# Patient Record
Sex: FEMALE | Race: WHITE | Employment: OTHER | URBAN - METROPOLITAN AREA
[De-identification: names, ages, dates, MRNs, and addresses within clinical notes are randomized per-mention and may not be internally consistent; named-entity substitution may affect disease eponyms.]

---

## 2017-07-03 ENCOUNTER — APPOINTMENT (OUTPATIENT)
Dept: LAB | Facility: HOSPITAL | Age: 77
End: 2017-07-03
Attending: DERMATOLOGY
Payer: MEDICARE

## 2017-07-03 ENCOUNTER — OFFICE VISIT (OUTPATIENT)
Dept: DERMATOLOGY CLINIC | Facility: CLINIC | Age: 77
End: 2017-07-03

## 2017-07-03 DIAGNOSIS — L81.4 LENTIGO: ICD-10-CM

## 2017-07-03 DIAGNOSIS — D22.9 MULTIPLE NEVI: ICD-10-CM

## 2017-07-03 DIAGNOSIS — L82.1 SEBORRHEIC KERATOSES: ICD-10-CM

## 2017-07-03 DIAGNOSIS — L82.0 INFLAMED SEBORRHEIC KERATOSIS: ICD-10-CM

## 2017-07-03 DIAGNOSIS — D48.5 NEOPLASM OF UNCERTAIN BEHAVIOR OF SKIN: Primary | ICD-10-CM

## 2017-07-03 PROCEDURE — 11100 BIOPSY OF SKIN LESION: CPT | Performed by: DERMATOLOGY

## 2017-07-03 PROCEDURE — 88305 TISSUE EXAM BY PATHOLOGIST: CPT

## 2017-07-03 PROCEDURE — 99202 OFFICE O/P NEW SF 15 MIN: CPT | Performed by: DERMATOLOGY

## 2017-07-03 RX ORDER — LOSARTAN POTASSIUM 100 MG/1
TABLET ORAL DAILY
COMMUNITY

## 2017-07-03 RX ORDER — ATORVASTATIN CALCIUM 40 MG/1
40 TABLET, FILM COATED ORAL NIGHTLY
COMMUNITY

## 2017-07-03 NOTE — H&P
Past Medical History:   Diagnosis Date   • Atrial fibrillation [facility])    • Essential hypertension    • Hyperlipidemia      History reviewed. No pertinent surgical history.     Social History  Social History   Marital status:   Spouse name: N/A    Year

## 2017-07-06 NOTE — PROGRESS NOTES
The pathology report from last visit showed  Verruca vulgaris  . Please log in test results, send biopsy results letter. Pt to rtc 1 year or prn.

## 2017-07-23 NOTE — PROGRESS NOTES
Nicole Reyes is a 78year old female. HPI:     CC:  Patient presents with:  Lesion: 1st time KMT no personal h/o skin cancer would like a couple of facial lesions checked        Allergies:  Review of patient's allergies indicates no known allergies.     H presents with:  Lesion: 1st time KMT no personal h/o skin cancer would like a couple of facial lesions checked    Patient has been with metastatic melanoma concerned he has had multiple skin cancers as noted lesion growing quickly tender at left lower lid. of uncertain behavior of skin  (primary encounter diagnosis)  Inflamed seborrheic keratosis  Seborrheic keratoses  Lentigo  Multiple nevi    See details on map. Remarkable for:    Rule out squamous cell carcinoma left lower lid, infraorbital area.   4

## 2017-11-21 PROBLEM — F32.A ANXIETY AND DEPRESSION: Status: ACTIVE | Noted: 2017-11-21

## 2017-11-21 PROBLEM — J06.9 UPPER RESPIRATORY TRACT INFECTION, UNSPECIFIED TYPE: Status: ACTIVE | Noted: 2017-11-21

## 2017-11-21 PROBLEM — F41.9 ANXIETY AND DEPRESSION: Status: ACTIVE | Noted: 2017-11-21

## 2017-11-21 PROBLEM — H93.12 TINNITUS, LEFT: Status: ACTIVE | Noted: 2017-11-21

## 2017-11-21 PROBLEM — H93.13 TINNITUS OF BOTH EARS: Status: ACTIVE | Noted: 2017-11-21

## 2018-02-13 PROCEDURE — 81001 URINALYSIS AUTO W/SCOPE: CPT | Performed by: INTERNAL MEDICINE

## 2018-02-13 PROCEDURE — 87086 URINE CULTURE/COLONY COUNT: CPT | Performed by: INTERNAL MEDICINE

## 2018-09-19 PROCEDURE — 87086 URINE CULTURE/COLONY COUNT: CPT | Performed by: INTERNAL MEDICINE

## 2018-09-19 PROCEDURE — 81001 URINALYSIS AUTO W/SCOPE: CPT | Performed by: INTERNAL MEDICINE

## 2019-01-18 ENCOUNTER — OFFICE VISIT (OUTPATIENT)
Dept: SLEEP MEDICINE | Age: 79
End: 2019-01-18

## 2019-01-18 DIAGNOSIS — G47.33 OSA (OBSTRUCTIVE SLEEP APNEA): ICD-10-CM

## 2019-01-18 PROCEDURE — 95806 SLEEP STUDY UNATT&RESP EFFT: CPT | Performed by: INTERNAL MEDICINE

## 2019-05-15 PROCEDURE — 81001 URINALYSIS AUTO W/SCOPE: CPT | Performed by: INTERNAL MEDICINE

## 2020-09-02 PROBLEM — M17.11 PRIMARY OSTEOARTHRITIS OF RIGHT KNEE: Status: ACTIVE | Noted: 2019-07-30

## 2020-09-02 PROBLEM — M17.11 ARTHRITIS OF RIGHT KNEE: Status: ACTIVE | Noted: 2019-10-10

## 2020-09-02 PROBLEM — M19.011 PRIMARY OSTEOARTHRITIS OF RIGHT SHOULDER: Status: ACTIVE | Noted: 2020-01-10

## 2020-09-02 PROBLEM — Z96.651 S/P TOTAL KNEE ARTHROPLASTY, RIGHT: Status: ACTIVE | Noted: 2019-10-11

## 2020-09-02 PROBLEM — I48.19 PERSISTENT ATRIAL FIBRILLATION (HCC): Status: ACTIVE | Noted: 2020-09-02

## 2021-04-20 ENCOUNTER — LAB REQUISITION (OUTPATIENT)
Dept: LAB | Age: 81
End: 2021-04-20

## 2021-04-20 DIAGNOSIS — R19.7 DIARRHEA, UNSPECIFIED: ICD-10-CM

## 2021-04-20 DIAGNOSIS — R19.4 CHANGE IN BOWEL HABIT: ICD-10-CM

## 2021-04-20 DIAGNOSIS — K62.1 RECTAL POLYP: ICD-10-CM

## 2021-04-20 DIAGNOSIS — K63.5 POLYP OF COLON: ICD-10-CM

## 2021-04-20 PROCEDURE — 87177 OVA AND PARASITES SMEARS: CPT | Performed by: CLINICAL MEDICAL LABORATORY

## 2021-04-20 PROCEDURE — 87209 SMEAR COMPLEX STAIN: CPT | Performed by: CLINICAL MEDICAL LABORATORY

## 2021-04-20 PROCEDURE — 87493 C DIFF AMPLIFIED PROBE: CPT | Performed by: CLINICAL MEDICAL LABORATORY

## 2021-04-21 LAB — C DIFF TOX B TCDB STL QL NAA+PROBE: NOT DETECTED

## 2021-04-22 LAB — O+P SPEC MICRO: NORMAL

## 2021-11-02 ENCOUNTER — IMAGING SERVICES (OUTPATIENT)
Dept: MAMMOGRAPHY | Age: 81
End: 2021-11-02
Attending: NURSE PRACTITIONER

## 2021-11-02 ENCOUNTER — IMAGING SERVICES (OUTPATIENT)
Dept: BONE DENSITY | Age: 81
End: 2021-11-02
Attending: NURSE PRACTITIONER

## 2021-11-02 DIAGNOSIS — Z12.31 SCREENING MAMMOGRAM, ENCOUNTER FOR: ICD-10-CM

## 2021-11-02 DIAGNOSIS — Z78.0 POSTMENOPAUSAL: ICD-10-CM

## 2021-11-02 PROCEDURE — 77063 BREAST TOMOSYNTHESIS BI: CPT | Performed by: RADIOLOGY

## 2021-11-02 PROCEDURE — 77080 DXA BONE DENSITY AXIAL: CPT | Performed by: RADIOLOGY

## 2021-11-02 PROCEDURE — 77067 SCR MAMMO BI INCL CAD: CPT | Performed by: RADIOLOGY

## 2021-11-08 ENCOUNTER — IMAGING SERVICES (OUTPATIENT)
Dept: MAMMOGRAPHY | Age: 81
End: 2021-11-08
Attending: NURSE PRACTITIONER

## 2021-11-08 DIAGNOSIS — R92.8 ABNORMAL MAMMOGRAM: ICD-10-CM

## 2021-11-08 PROCEDURE — 76641 ULTRASOUND BREAST COMPLETE: CPT | Performed by: RADIOLOGY

## 2021-11-08 PROCEDURE — G0279 TOMOSYNTHESIS, MAMMO: HCPCS | Performed by: RADIOLOGY

## 2021-11-08 PROCEDURE — 77065 DX MAMMO INCL CAD UNI: CPT | Performed by: RADIOLOGY

## 2022-07-14 ENCOUNTER — IMAGING SERVICES (OUTPATIENT)
Dept: MAMMOGRAPHY | Age: 82
End: 2022-07-14
Attending: NURSE PRACTITIONER

## 2022-07-14 DIAGNOSIS — R92.8 CATEGORY 3 MAMMOGRAPHY RESULT WITH SHORT FOLLOW-UP INTERVAL SUGGESTED FOR PROBABLY BENIGN FINDING: ICD-10-CM

## 2022-07-14 DIAGNOSIS — Z09 FOLLOW-UP EXAMINATION: ICD-10-CM

## 2022-07-14 PROCEDURE — 77065 DX MAMMO INCL CAD UNI: CPT | Performed by: RADIOLOGY

## 2022-07-14 PROCEDURE — 76641 ULTRASOUND BREAST COMPLETE: CPT | Performed by: RADIOLOGY

## 2022-07-14 PROCEDURE — G0279 TOMOSYNTHESIS, MAMMO: HCPCS | Performed by: RADIOLOGY

## 2022-07-14 PROCEDURE — G1004 CDSM NDSC: HCPCS | Performed by: RADIOLOGY

## 2022-07-21 ENCOUNTER — IMAGING SERVICES (OUTPATIENT)
Dept: GENERAL RADIOLOGY | Age: 82
End: 2022-07-21
Attending: HOSPITALIST

## 2022-07-21 DIAGNOSIS — I48.91 ATRIAL FIBRILLATION (CMD): ICD-10-CM

## 2022-07-21 DIAGNOSIS — G62.9 NEUROPATHY: ICD-10-CM

## 2022-07-21 PROCEDURE — 72114 X-RAY EXAM L-S SPINE BENDING: CPT | Performed by: RADIOLOGY

## 2023-03-25 ENCOUNTER — APPOINTMENT (OUTPATIENT)
Dept: MAMMOGRAPHY | Age: 83
End: 2023-03-25
Attending: FAMILY MEDICINE

## 2023-04-28 DIAGNOSIS — R92.2 INCONCLUSIVE MAMMOGRAM: ICD-10-CM

## 2023-04-28 DIAGNOSIS — N63.0 BREAST NODULE: Primary | ICD-10-CM

## 2023-05-01 ENCOUNTER — IMAGING SERVICES (OUTPATIENT)
Dept: MAMMOGRAPHY | Age: 83
End: 2023-05-01
Attending: FAMILY MEDICINE

## 2023-05-01 DIAGNOSIS — R92.2 INCONCLUSIVE MAMMOGRAM: ICD-10-CM

## 2023-05-01 DIAGNOSIS — N63.0 BREAST NODULE: ICD-10-CM

## 2023-05-01 PROCEDURE — G0279 TOMOSYNTHESIS, MAMMO: HCPCS | Performed by: RADIOLOGY

## 2023-05-01 PROCEDURE — 77066 DX MAMMO INCL CAD BI: CPT | Performed by: RADIOLOGY

## 2024-04-02 PROBLEM — E78.00 PURE HYPERCHOLESTEROLEMIA: Status: ACTIVE | Noted: 2024-04-02

## 2024-04-02 PROBLEM — I10 HYPERTENSION: Status: ACTIVE | Noted: 2022-07-11

## 2024-04-04 ENCOUNTER — LAB SERVICES (OUTPATIENT)
Dept: LAB | Age: 84
End: 2024-04-04

## 2024-04-04 ENCOUNTER — ANCILLARY PROCEDURE (OUTPATIENT)
Dept: CARDIOLOGY | Age: 84
End: 2024-04-04
Attending: INTERNAL MEDICINE

## 2024-04-04 DIAGNOSIS — I48.0 PAROXYSMAL ATRIAL FIBRILLATION  (CMD): ICD-10-CM

## 2024-04-04 LAB
ALBUMIN SERPL-MCNC: 3.9 G/DL (ref 3.6–5.1)
ALBUMIN/GLOB SERPL: 1.5 {RATIO} (ref 1–2.4)
ALP SERPL-CCNC: 106 UNITS/L (ref 45–117)
ALT SERPL-CCNC: 16 UNITS/L
ANION GAP SERPL CALC-SCNC: 15 MMOL/L (ref 7–19)
AST SERPL-CCNC: 14 UNITS/L
AV STENOSIS SEVERITY TEXT: NORMAL
AVI LVOT PEAK GRADIENT (LVOTMG): 1.1
BASOPHILS # BLD: 0.1 K/MCL (ref 0–0.3)
BASOPHILS NFR BLD: 1 %
BILIRUB SERPL-MCNC: 0.7 MG/DL (ref 0.2–1)
BUN SERPL-MCNC: 16 MG/DL (ref 6–20)
BUN/CREAT SERPL: 21 (ref 7–25)
CALCIUM SERPL-MCNC: 9.4 MG/DL (ref 8.4–10.2)
CHLORIDE SERPL-SCNC: 106 MMOL/L (ref 97–110)
CHOLEST SERPL-MCNC: 159 MG/DL
CHOLEST/HDLC SERPL: 3.3 {RATIO}
CO2 SERPL-SCNC: 24 MMOL/L (ref 21–32)
CREAT SERPL-MCNC: 0.77 MG/DL (ref 0.51–0.95)
DEPRECATED RDW RBC: 41.6 FL (ref 39–50)
EGFRCR SERPLBLD CKD-EPI 2021: 77 ML/MIN/{1.73_M2}
EOSINOPHIL # BLD: 0.4 K/MCL (ref 0–0.5)
EOSINOPHIL NFR BLD: 5 %
ERYTHROCYTE [DISTWIDTH] IN BLOOD: 12.5 % (ref 11–15)
FASTING DURATION TIME PATIENT: 12 HOURS (ref 0–999)
GLOBULIN SER-MCNC: 2.6 G/DL (ref 2–4)
GLUCOSE SERPL-MCNC: 94 MG/DL (ref 70–99)
HCT VFR BLD CALC: 46.4 % (ref 36–46.5)
HDLC SERPL-MCNC: 48 MG/DL
HGB BLD-MCNC: 15.8 G/DL (ref 12–15.5)
IMM GRANULOCYTES # BLD AUTO: 0 K/MCL (ref 0–0.2)
IMM GRANULOCYTES # BLD: 0 %
LDLC SERPL CALC-MCNC: 80 MG/DL
LEFT INTERNAL DIMENSION IN SYSTOLE (LVSD): 1.1
LEFT VENTRICULAR INTERNAL DIMENSION IN DIASTOLE (LVDD): 2.3
LEFT VENTRICULAR POSTERIOR WALL IN END DIASTOLE (LVPW): 3.5
LV EF: NORMAL %
LVOT 2D (LVOTD): 15.6
LVOT VTI (LVOTVTI): 0.87
LYMPHOCYTES # BLD: 2.4 K/MCL (ref 1–4)
LYMPHOCYTES NFR BLD: 30 %
MCH RBC QN AUTO: 30.7 PG (ref 26–34)
MCHC RBC AUTO-ENTMCNC: 34.1 G/DL (ref 32–36.5)
MCV RBC AUTO: 90.3 FL (ref 78–100)
MONOCYTES # BLD: 0.7 K/MCL (ref 0.3–0.9)
MONOCYTES NFR BLD: 9 %
NEUTROPHILS # BLD: 4.4 K/MCL (ref 1.8–7.7)
NEUTROPHILS NFR BLD: 55 %
NONHDLC SERPL-MCNC: 111 MG/DL
NRBC BLD MANUAL-RTO: 0 /100 WBC
PLATELET # BLD AUTO: 249 K/MCL (ref 140–450)
POTASSIUM SERPL-SCNC: 4 MMOL/L (ref 3.4–5.1)
PROT SERPL-MCNC: 6.5 G/DL (ref 6.4–8.2)
RBC # BLD: 5.14 MIL/MCL (ref 4–5.2)
RV END SYSTOLIC LONGITUDINAL STRAIN FREE WALL (RVGS): 1.7
SODIUM SERPL-SCNC: 141 MMOL/L (ref 135–145)
T4 FREE SERPL-MCNC: 1 NG/DL (ref 0.8–1.5)
TRICUSPID VALVE ANNULAR PEAK VELOCITY (TVAPV): 22
TRIGL SERPL-MCNC: 156 MG/DL
TSH SERPL-ACNC: 3.68 MCUNITS/ML (ref 0.35–5)
WBC # BLD: 8 K/MCL (ref 4.2–11)

## 2024-04-04 PROCEDURE — 80053 COMPREHEN METABOLIC PANEL: CPT | Performed by: CLINICAL MEDICAL LABORATORY

## 2024-04-04 PROCEDURE — 85025 COMPLETE CBC W/AUTO DIFF WBC: CPT | Performed by: CLINICAL MEDICAL LABORATORY

## 2024-04-04 PROCEDURE — 36415 COLL VENOUS BLD VENIPUNCTURE: CPT | Performed by: CLINICAL MEDICAL LABORATORY

## 2024-04-04 PROCEDURE — 84439 ASSAY OF FREE THYROXINE: CPT | Performed by: CLINICAL MEDICAL LABORATORY

## 2024-04-04 PROCEDURE — 80061 LIPID PANEL: CPT | Performed by: CLINICAL MEDICAL LABORATORY

## 2024-04-04 PROCEDURE — 93306 TTE W/DOPPLER COMPLETE: CPT | Performed by: INTERNAL MEDICINE

## 2024-04-04 PROCEDURE — 84443 ASSAY THYROID STIM HORMONE: CPT | Performed by: CLINICAL MEDICAL LABORATORY

## 2024-11-11 PROBLEM — E78.00 PURE HYPERCHOLESTEROLEMIA: Chronic | Status: ACTIVE | Noted: 2024-04-02

## 2024-11-11 PROBLEM — I10 HYPERTENSION: Chronic | Status: ACTIVE | Noted: 2022-07-11

## 2024-12-04 ENCOUNTER — LAB SERVICES (OUTPATIENT)
Dept: LAB | Age: 84
End: 2024-12-04

## 2024-12-04 DIAGNOSIS — I10 PRIMARY HYPERTENSION: ICD-10-CM

## 2024-12-04 DIAGNOSIS — E78.00 PURE HYPERCHOLESTEROLEMIA: Chronic | ICD-10-CM

## 2024-12-04 LAB
ANION GAP SERPL CALC-SCNC: 13 MMOL/L (ref 7–19)
BUN SERPL-MCNC: 13 MG/DL (ref 6–20)
BUN/CREAT SERPL: 16 (ref 7–25)
CALCIUM SERPL-MCNC: 9.4 MG/DL (ref 8.4–10.2)
CHLORIDE SERPL-SCNC: 105 MMOL/L (ref 97–110)
CO2 SERPL-SCNC: 26 MMOL/L (ref 21–32)
CREAT SERPL-MCNC: 0.81 MG/DL (ref 0.51–0.95)
EGFRCR SERPLBLD CKD-EPI 2021: 73 ML/MIN/{1.73_M2}
FASTING DURATION TIME PATIENT: NORMAL H
GLUCOSE SERPL-MCNC: 96 MG/DL (ref 70–99)
POTASSIUM SERPL-SCNC: 3.9 MMOL/L (ref 3.4–5.1)
SODIUM SERPL-SCNC: 140 MMOL/L (ref 135–145)

## 2024-12-04 PROCEDURE — 36415 COLL VENOUS BLD VENIPUNCTURE: CPT | Performed by: CLINICAL MEDICAL LABORATORY

## 2024-12-04 PROCEDURE — 80048 BASIC METABOLIC PNL TOTAL CA: CPT | Performed by: CLINICAL MEDICAL LABORATORY

## 2025-02-06 ENCOUNTER — HOSPITAL ENCOUNTER (OUTPATIENT)
Dept: GENERAL RADIOLOGY | Age: 85
Discharge: HOME | End: 2025-02-06
Attending: EMERGENCY MEDICINE

## 2025-02-06 ENCOUNTER — WALK IN (OUTPATIENT)
Dept: URGENT CARE | Age: 85
End: 2025-02-06
Attending: EMERGENCY MEDICINE

## 2025-02-06 VITALS
HEIGHT: 64 IN | BODY MASS INDEX: 35.85 KG/M2 | DIASTOLIC BLOOD PRESSURE: 94 MMHG | WEIGHT: 210 LBS | TEMPERATURE: 97 F | OXYGEN SATURATION: 93 % | HEART RATE: 109 BPM | SYSTOLIC BLOOD PRESSURE: 131 MMHG | RESPIRATION RATE: 20 BRPM

## 2025-02-06 DIAGNOSIS — J45.20 MILD INTERMITTENT REACTIVE AIRWAY DISEASE WITH WHEEZING WITHOUT COMPLICATION (CMD): Primary | ICD-10-CM

## 2025-02-06 DIAGNOSIS — R05.1 ACUTE COUGH: ICD-10-CM

## 2025-02-06 PROCEDURE — 71046 X-RAY EXAM CHEST 2 VIEWS: CPT

## 2025-02-06 RX ORDER — ALBUTEROL SULFATE 90 UG/1
2 INHALANT RESPIRATORY (INHALATION) EVERY 4 HOURS PRN
Qty: 1 EACH | Refills: 0 | Status: SHIPPED | OUTPATIENT
Start: 2025-02-06

## 2025-02-06 RX ORDER — PREDNISONE 20 MG/1
40 TABLET ORAL DAILY
Qty: 10 TABLET | Refills: 0 | Status: SHIPPED | OUTPATIENT
Start: 2025-02-06 | End: 2025-02-11

## 2025-02-06 ASSESSMENT — PAIN SCALES - GENERAL
PAINLEVEL: 0
PAINLEVEL_OUTOF10: 0

## (undated) NOTE — LETTER
7/6/2017              [address] [facility], [place] [address]         Dear Janet Mccollum,      The report of the Biopsy done on July 3rd shows a verruca vulgaris (warty growth).   This is a benign (not cancerous) growth, and requires n